# Patient Record
Sex: MALE | Race: BLACK OR AFRICAN AMERICAN | NOT HISPANIC OR LATINO | Employment: PART TIME | URBAN - METROPOLITAN AREA
[De-identification: names, ages, dates, MRNs, and addresses within clinical notes are randomized per-mention and may not be internally consistent; named-entity substitution may affect disease eponyms.]

---

## 2019-09-11 ENCOUNTER — HOSPITAL ENCOUNTER (EMERGENCY)
Facility: HOSPITAL | Age: 29
Discharge: HOME | End: 2019-09-11
Attending: EMERGENCY MEDICINE | Admitting: EMERGENCY MEDICINE
Payer: COMMERCIAL

## 2019-09-11 VITALS
OXYGEN SATURATION: 98 % | HEART RATE: 78 BPM | SYSTOLIC BLOOD PRESSURE: 142 MMHG | TEMPERATURE: 98.1 F | RESPIRATION RATE: 18 BRPM | DIASTOLIC BLOOD PRESSURE: 92 MMHG

## 2019-09-11 DIAGNOSIS — R05.9 COUGH: Primary | ICD-10-CM

## 2019-09-11 DIAGNOSIS — G51.0 FACIAL PARESIS: ICD-10-CM

## 2019-09-11 DIAGNOSIS — J11.1 INFLUENZA-LIKE ILLNESS: ICD-10-CM

## 2019-09-11 PROCEDURE — 36415 COLL VENOUS BLD VENIPUNCTURE: CPT | Performed by: EMERGENCY MEDICINE

## 2019-09-11 PROCEDURE — 99283 EMERGENCY DEPT VISIT LOW MDM: CPT

## 2019-09-11 PROCEDURE — 87529 HSV DNA AMP PROBE: CPT | Performed by: EMERGENCY MEDICINE

## 2019-09-11 PROCEDURE — 99283 EMERGENCY DEPT VISIT LOW MDM: CPT | Performed by: EMERGENCY MEDICINE

## 2019-09-11 PROCEDURE — 86618 LYME DISEASE ANTIBODY: CPT | Performed by: EMERGENCY MEDICINE

## 2019-09-11 RX ORDER — DOXYCYCLINE HYCLATE 100 MG/1
100 CAPSULE ORAL 2 TIMES DAILY
Qty: 28 CAPSULE | Refills: 0 | Status: SHIPPED | OUTPATIENT
Start: 2019-09-11 | End: 2019-09-25

## 2019-09-11 RX ORDER — DOXYCYCLINE HYCLATE 100 MG/1
100 CAPSULE ORAL 2 TIMES DAILY
Qty: 28 CAPSULE | Refills: 0 | Status: SHIPPED | OUTPATIENT
Start: 2019-09-11 | End: 2019-09-11 | Stop reason: SDUPTHER

## 2019-09-11 RX ORDER — ACYCLOVIR 400 MG/1
400 TABLET ORAL 4 TIMES DAILY
Qty: 40 TABLET | Refills: 0 | Status: SHIPPED | OUTPATIENT
Start: 2019-09-11 | End: 2019-09-21

## 2019-09-11 RX ORDER — DOXYCYCLINE HYCLATE 100 MG/1
100 CAPSULE ORAL ONCE
Status: COMPLETED | OUTPATIENT
Start: 2019-09-11 | End: 2019-09-11

## 2019-09-11 RX ORDER — ACETAMINOPHEN 160 MG/5ML
650 SUSPENSION, ORAL (FINAL DOSE FORM) ORAL ONCE
Status: COMPLETED | OUTPATIENT
Start: 2019-09-11 | End: 2019-09-11

## 2019-09-11 RX ADMIN — DEXAMETHASONE SODIUM PHOSPHATE 10 MG: 10 INJECTION, SOLUTION INTRAMUSCULAR; INTRAVENOUS at 16:15

## 2019-09-11 RX ADMIN — DOXYCYCLINE 100 MG: 100 CAPSULE ORAL at 16:22

## 2019-09-11 RX ADMIN — ACETAMINOPHEN 650 MG: 160 SUSPENSION ORAL at 16:15

## 2019-09-11 NOTE — ED PROVIDER NOTES
History  Chief Complaint   Patient presents with    Cold Like Symptoms     pt c/o headache, dry mouth, earaches since yesterday     79-year-old male presenting to the emergency department for evaluation of cold-like symptoms  Patient has had nasal congestion sore throat headache myalgias for over chief, came here today because he noticed that his face seemed asymmetrical, seemed to not be able to move the left side of his face is much  Denies any other neurological symptoms, denies any numbness weakness tingling, disequilibrium, or any other bowels gait abnormalities  Has had subjective fevers at home, some mild abdominal discomfort  None       History reviewed  No pertinent past medical history  History reviewed  No pertinent surgical history  History reviewed  No pertinent family history  I have reviewed and agree with the history as documented  Social History     Tobacco Use    Smoking status: Never Smoker    Smokeless tobacco: Never Used   Substance Use Topics    Alcohol use: Not on file    Drug use: Never        Review of Systems   Constitutional: Negative for appetite change, chills, fatigue and fever  HENT: Positive for congestion  Negative for sneezing and sore throat  Eyes: Negative for visual disturbance  Respiratory: Negative for cough, choking, chest tightness, shortness of breath and wheezing  Cardiovascular: Negative for chest pain and palpitations  Gastrointestinal: Negative for abdominal pain, constipation, diarrhea, nausea and vomiting  Genitourinary: Negative for difficulty urinating and dysuria  Neurological: Positive for weakness  Negative for dizziness, light-headedness, numbness and headaches  All other systems reviewed and are negative  Physical Exam  Physical Exam   Constitutional: He is oriented to person, place, and time  He appears well-developed and well-nourished  No distress  HENT:   Head: Normocephalic and atraumatic     Mouth/Throat: Oropharynx is clear and moist    Eyes: Pupils are equal, round, and reactive to light  EOM are normal    Neck: No JVD present  No tracheal deviation present  Cardiovascular: Normal rate, regular rhythm, normal heart sounds and intact distal pulses  Exam reveals no gallop and no friction rub  No murmur heard  Pulmonary/Chest: Effort normal and breath sounds normal  No respiratory distress  He has no wheezes  He has no rales  Abdominal: Soft  Bowel sounds are normal  He exhibits no distension  There is no tenderness  There is no rebound and no guarding  Neurological: He is alert and oriented to person, place, and time  No cranial nerve deficit  He exhibits normal muscle tone  The patient is overall grossly intact neurologically however has mild facial paresis on the left side   Skin: Skin is warm and dry  He is not diaphoretic  No pallor  Psychiatric: He has a normal mood and affect  His behavior is normal    Nursing note and vitals reviewed        Vital Signs  ED Triage Vitals   Temperature Pulse Respirations Blood Pressure SpO2   09/11/19 1544 09/11/19 1542 09/11/19 1542 09/11/19 1542 09/11/19 1542   98 1 °F (36 7 °C) 78 18 142/92 98 %      Temp Source Heart Rate Source Patient Position - Orthostatic VS BP Location FiO2 (%)   09/11/19 1544 09/11/19 1542 -- -- --   Oral Monitor         Pain Score       --                  Vitals:    09/11/19 1542   BP: 142/92   Pulse: 78         Visual Acuity      ED Medications  Medications   dexamethasone 10 mg/mL oral liquid 10 mg 1 mL (10 mg Oral Given 9/11/19 1615)   acetaminophen (TYLENOL) oral suspension 650 mg (650 mg Oral Given 9/11/19 1615)   doxycycline hyclate (VIBRAMYCIN) capsule 100 mg (100 mg Oral Given 9/11/19 1622)       Diagnostic Studies  Results Reviewed     Procedure Component Value Units Date/Time    HSV TYPE 1,2 DNA PCR [[id number]] Collected:  09/11/19 1620    Lab Status:  Final result Specimen:  Blood from Eye, Right Updated:  09/16/19 0605     HSV 1, PCR Negative     HSV 2 , PCR Negative    Narrative:       Performed at:  [place] [address], [place], [place]  [id number]  : Will Reynoso MD, Phone:  [phone]    Lyme Antibody Profile with reflex to [facility] [[id number]] Collected:  09/11/19 1620    Lab Status:  Final result Specimen:  Blood from Arm, Right Updated:  09/13/19 1406     Lyme IgG/IgM Ab <0 91 ISR     Narrative:       Performed at:  [address] [place] [address]  [id number]  : Roselia Sheriff MD, Phone:  [phone]                 No orders to display              Procedures  Procedures       ED Course                               MDM  Number of Diagnoses or Management Options  Cough:   Facial paresis:   Influenza-like illness:   Diagnosis management comments: 57-year-old male with Bell's palsy like syndromes, will test and treated for Lyme, also HSV  Disposition  Final diagnoses:   Cough   Influenza-like illness   Facial paresis     Time reflects when diagnosis was documented in both MDM as applicable and the Disposition within this note     Time User Action Codes Description Comment    9/11/2019  4:46 PM Benigno Bhakta Add [R05] Cough     9/11/2019  4:46 PM Livia, [address] illness     9/11/2019  4:47 PM Keli Bhakta Add [G51 0] Facial paresis       ED Disposition     ED Disposition Condition Date/Time Comment    Discharge Stable Wed Sep 11, 2019  4:46 PM Kelly Pena discharge to home/self care              Follow-up Information     Follow up With Specialties Details Why Contact Info    [employer]    [phone]            Discharge Medication List as of 9/11/2019  4:50 PM      START taking these medications    Details   acyclovir (ZOVIRAX) 400 MG tablet Take 1 tablet (400 mg total) by mouth 4 (four) times a day for 10 days, Starting Wed 9/11/2019, Until Sat 9/21/2019, Print      doxycycline hyclate (VIBRAMYCIN) 100 mg capsule Take 1 capsule (100 mg total) by mouth 2 (two) times a day for 14 days, Starting Wed 9/11/2019, Until Wed 9/25/2019, Print           No discharge procedures on file      ED Provider  Electronically Signed by           Jermaine Roberts MD  09/20/19 3603

## 2019-09-13 LAB — B BURGDOR IGG+IGM SER-ACNC: <0.91 ISR (ref 0–0.9)

## 2019-09-16 LAB
HSV1 DNA SPEC QL NAA+PROBE: NEGATIVE
HSV2 DNA SPEC QL NAA+PROBE: NEGATIVE